# Patient Record
Sex: MALE | Race: OTHER | HISPANIC OR LATINO | URBAN - METROPOLITAN AREA
[De-identification: names, ages, dates, MRNs, and addresses within clinical notes are randomized per-mention and may not be internally consistent; named-entity substitution may affect disease eponyms.]

---

## 2024-10-14 ENCOUNTER — INPATIENT (INPATIENT)
Facility: HOSPITAL | Age: 69
LOS: 0 days | Discharge: HOME | End: 2024-10-15
Attending: INTERNAL MEDICINE | Admitting: INTERNAL MEDICINE
Payer: MEDICARE

## 2024-10-14 VITALS
WEIGHT: 198.42 LBS | SYSTOLIC BLOOD PRESSURE: 150 MMHG | RESPIRATION RATE: 18 BRPM | DIASTOLIC BLOOD PRESSURE: 88 MMHG | HEIGHT: 62 IN | OXYGEN SATURATION: 98 % | HEART RATE: 85 BPM | TEMPERATURE: 98 F

## 2024-10-14 DIAGNOSIS — E78.5 HYPERLIPIDEMIA, UNSPECIFIED: ICD-10-CM

## 2024-10-14 DIAGNOSIS — I20.9 ANGINA PECTORIS, UNSPECIFIED: ICD-10-CM

## 2024-10-14 DIAGNOSIS — I10 ESSENTIAL (PRIMARY) HYPERTENSION: ICD-10-CM

## 2024-10-14 DIAGNOSIS — Z98.49 CATARACT EXTRACTION STATUS, UNSPECIFIED EYE: Chronic | ICD-10-CM

## 2024-10-14 PROBLEM — I63.9 CEREBRAL INFARCTION, UNSPECIFIED: Chronic | Status: ACTIVE | Noted: 2022-08-22

## 2024-10-14 LAB
ALBUMIN SERPL ELPH-MCNC: 4.1 G/DL — SIGNIFICANT CHANGE UP (ref 3.3–5)
ALP SERPL-CCNC: 68 U/L — SIGNIFICANT CHANGE UP (ref 40–120)
ALT FLD-CCNC: 20 U/L — SIGNIFICANT CHANGE UP (ref 4–41)
ANION GAP SERPL CALC-SCNC: 12 MMOL/L — SIGNIFICANT CHANGE UP (ref 7–14)
AST SERPL-CCNC: 21 U/L — SIGNIFICANT CHANGE UP (ref 4–40)
BASOPHILS # BLD AUTO: 0.04 K/UL — SIGNIFICANT CHANGE UP (ref 0–0.2)
BASOPHILS NFR BLD AUTO: 0.7 % — SIGNIFICANT CHANGE UP (ref 0–2)
BILIRUB SERPL-MCNC: 0.6 MG/DL — SIGNIFICANT CHANGE UP (ref 0.2–1.2)
BUN SERPL-MCNC: 36 MG/DL — HIGH (ref 7–23)
CALCIUM SERPL-MCNC: 8.9 MG/DL — SIGNIFICANT CHANGE UP (ref 8.4–10.5)
CHLORIDE SERPL-SCNC: 102 MMOL/L — SIGNIFICANT CHANGE UP (ref 98–107)
CO2 SERPL-SCNC: 24 MMOL/L — SIGNIFICANT CHANGE UP (ref 22–31)
CREAT SERPL-MCNC: 1.45 MG/DL — HIGH (ref 0.5–1.3)
EGFR: 52 ML/MIN/1.73M2 — LOW
EOSINOPHIL # BLD AUTO: 0.06 K/UL — SIGNIFICANT CHANGE UP (ref 0–0.5)
EOSINOPHIL NFR BLD AUTO: 1 % — SIGNIFICANT CHANGE UP (ref 0–6)
GLUCOSE SERPL-MCNC: 102 MG/DL — HIGH (ref 70–99)
HCT VFR BLD CALC: 37.7 % — LOW (ref 39–50)
HGB BLD-MCNC: 12.7 G/DL — LOW (ref 13–17)
IANC: 4.1 K/UL — SIGNIFICANT CHANGE UP (ref 1.8–7.4)
IMM GRANULOCYTES NFR BLD AUTO: 1.9 % — HIGH (ref 0–0.9)
LYMPHOCYTES # BLD AUTO: 0.93 K/UL — LOW (ref 1–3.3)
LYMPHOCYTES # BLD AUTO: 16.1 % — SIGNIFICANT CHANGE UP (ref 13–44)
MAGNESIUM SERPL-MCNC: 2.2 MG/DL — SIGNIFICANT CHANGE UP (ref 1.6–2.6)
MCHC RBC-ENTMCNC: 29.7 PG — SIGNIFICANT CHANGE UP (ref 27–34)
MCHC RBC-ENTMCNC: 33.7 GM/DL — SIGNIFICANT CHANGE UP (ref 32–36)
MCV RBC AUTO: 88.3 FL — SIGNIFICANT CHANGE UP (ref 80–100)
MONOCYTES # BLD AUTO: 0.54 K/UL — SIGNIFICANT CHANGE UP (ref 0–0.9)
MONOCYTES NFR BLD AUTO: 9.3 % — SIGNIFICANT CHANGE UP (ref 2–14)
NEUTROPHILS # BLD AUTO: 4.1 K/UL — SIGNIFICANT CHANGE UP (ref 1.8–7.4)
NEUTROPHILS NFR BLD AUTO: 71 % — SIGNIFICANT CHANGE UP (ref 43–77)
NRBC # BLD: 0 /100 WBCS — SIGNIFICANT CHANGE UP (ref 0–0)
NRBC # FLD: 0 K/UL — SIGNIFICANT CHANGE UP (ref 0–0)
PLATELET # BLD AUTO: 162 K/UL — SIGNIFICANT CHANGE UP (ref 150–400)
POTASSIUM SERPL-MCNC: 4.7 MMOL/L — SIGNIFICANT CHANGE UP (ref 3.5–5.3)
POTASSIUM SERPL-SCNC: 4.7 MMOL/L — SIGNIFICANT CHANGE UP (ref 3.5–5.3)
PROT SERPL-MCNC: 6.9 G/DL — SIGNIFICANT CHANGE UP (ref 6–8.3)
RBC # BLD: 4.27 M/UL — SIGNIFICANT CHANGE UP (ref 4.2–5.8)
RBC # FLD: 13.1 % — SIGNIFICANT CHANGE UP (ref 10.3–14.5)
SODIUM SERPL-SCNC: 138 MMOL/L — SIGNIFICANT CHANGE UP (ref 135–145)
TROPONIN T, HIGH SENSITIVITY RESULT: 22 NG/L — SIGNIFICANT CHANGE UP
TROPONIN T, HIGH SENSITIVITY RESULT: 23 NG/L — SIGNIFICANT CHANGE UP
WBC # BLD: 5.78 K/UL — SIGNIFICANT CHANGE UP (ref 3.8–10.5)
WBC # FLD AUTO: 5.78 K/UL — SIGNIFICANT CHANGE UP (ref 3.8–10.5)

## 2024-10-14 PROCEDURE — 99223 1ST HOSP IP/OBS HIGH 75: CPT

## 2024-10-14 PROCEDURE — 99285 EMERGENCY DEPT VISIT HI MDM: CPT

## 2024-10-14 PROCEDURE — 71045 X-RAY EXAM CHEST 1 VIEW: CPT | Mod: 26

## 2024-10-14 RX ORDER — ATORVASTATIN CALCIUM 10 MG/1
80 TABLET, FILM COATED ORAL AT BEDTIME
Refills: 0 | Status: DISCONTINUED | OUTPATIENT
Start: 2024-10-14 | End: 2024-10-15

## 2024-10-14 RX ORDER — FAMOTIDINE 40 MG
20 TABLET ORAL ONCE
Refills: 0 | Status: COMPLETED | OUTPATIENT
Start: 2024-10-14 | End: 2024-10-14

## 2024-10-14 RX ORDER — CYCLOBENZAPRINE HCL 10 MG
5 TABLET ORAL ONCE
Refills: 0 | Status: COMPLETED | OUTPATIENT
Start: 2024-10-14 | End: 2024-10-14

## 2024-10-14 RX ORDER — ASPIRIN 325 MG
81 TABLET ORAL DAILY
Refills: 0 | Status: DISCONTINUED | OUTPATIENT
Start: 2024-10-14 | End: 2024-10-15

## 2024-10-14 RX ORDER — ACETAMINOPHEN 325 MG
1000 TABLET ORAL ONCE
Refills: 0 | Status: COMPLETED | OUTPATIENT
Start: 2024-10-14 | End: 2024-10-14

## 2024-10-14 RX ORDER — SODIUM CHLORIDE 0.9 % (FLUSH) 0.9 %
1000 SYRINGE (ML) INJECTION ONCE
Refills: 0 | Status: DISCONTINUED | OUTPATIENT
Start: 2024-10-14 | End: 2024-10-14

## 2024-10-14 RX ADMIN — Medication 20 MILLIGRAM(S): at 10:31

## 2024-10-14 RX ADMIN — ATORVASTATIN CALCIUM 80 MILLIGRAM(S): 10 TABLET, FILM COATED ORAL at 21:55

## 2024-10-14 RX ADMIN — Medication 400 MILLIGRAM(S): at 10:31

## 2024-10-14 RX ADMIN — Medication 5 MILLIGRAM(S): at 13:42

## 2024-10-14 NOTE — H&P ADULT - NSHPPHYSICALEXAM_GEN_ALL_CORE
Vital Signs Last 24 Hrs  T(C): 36.4 (14 Oct 2024 16:55), Max: 36.7 (14 Oct 2024 09:02)  T(F): 97.5 (14 Oct 2024 16:55), Max: 98 (14 Oct 2024 09:02)  HR: 61 (14 Oct 2024 16:55) (50 - 90)  BP: 128/78 (14 Oct 2024 16:55) (115/73 - 150/88)  BP(mean): --  RR: 16 (14 Oct 2024 16:55) (16 - 18)  SpO2: 100% (14 Oct 2024 16:55) (98% - 100%)    Parameters below as of 14 Oct 2024 16:55  Patient On (Oxygen Delivery Method): room air        PHYSICAL EXAM:  GENERAL: No Acute Distress  EYES: conjunctiva and sclera clear  ENMT: Moist mucous membranes   NECK: Supple  PULMONARY: Clear to auscultation bilaterally  CARDIAC: Regular rate and rhythm; No murmurs, rubs, or gallops  GASTROINTESTINAL: Abdomen soft, Nontender, Nondistended; Bowel sounds normal  EXTREMITIES:   No clubbing, cyanosis, or pedal edema  PSYCH: Normal Affect, Normal Behavior  NEUROLOGY:   - Mental status A&O x 3  SKIN: No rashes or lesions  MUSCULOSKELETAL: No joint swelling

## 2024-10-14 NOTE — ED ADULT NURSE NOTE - OBJECTIVE STATEMENT
Pt. A&OX4, c/o left sided chest pain radiating into neck starting today. Denies sob, dizziness, palpitations or n/v. Also c/o lower back pain worsening x few weeks. hx of 2 cardiac stents a few years ago. MD at bedside for evaluation. Vitals stable, breathing well on RA. Respirations even and unlabored. Placed on cardiac monitor. NSR noted. Will continue to monitor.

## 2024-10-14 NOTE — ED ADULT NURSE NOTE - NSFALLUNIVINTERV_ED_ALL_ED
Assistance OOB with selected safe patient handling equipment if applicable/Bed/Stretcher in lowest position, wheels locked, appropriate side rails in place/Call bell, personal items and telephone in reach/Instruct patient to call for assistance before getting out of bed/chair/stretcher/Non-slip footwear applied when patient is off stretcher/[place] to call system/Physically safe environment - no spills, clutter or unnecessary equipment/Purposeful proactive rounding/Room/bathroom lighting operational, light cord in reach

## 2024-10-14 NOTE — H&P ADULT - HISTORY OF PRESENT ILLNESS
68 y/o M with pmh of HTN, HLD, CAD s/p stent, CVA 2 year ago (with R lateral visual field defect) p/w chest pain.  Pt reports L sided chest pain since yesterday.  Pt reports pain is worse with exertion and improves with rest. Pain is described as sharp to pinching in quality.  No associated diaphoresis or dizziness.  No fever, chills or cough.  Pt also reports Lower back pain that radiates to LLQ for the past month.      In the ED, pt was given 1L NS, Tylenol, Pepcid, and Flexeril.

## 2024-10-14 NOTE — ED PROVIDER NOTE - NS ED ROS FT
CONSTITUTIONAL: No fever or chill  HEENT: Denies changes in vision and hearing.  RESPIRATORY: Denies SOB and cough.  CV: left sided chest pain  GI: Denies abdominal pain, nausea, vomiting and diarrhea.  : Denies dysuria and urinary frequency.  MSK: Left-sided neck pain, low back pain  SKIN: Denies rash   NEUROLOGICAL: Denies headache and syncope.

## 2024-10-14 NOTE — H&P ADULT - NSICDXPASTSURGICALHX_GEN_ALL_CORE_FT
[FreeTextEntry6] : Pt in clinic for lab results from last weeks CPE and to have sport form completed. Pt forgot to bring signed parental consent to play sports at last visit, brought it today. Pt appears well, no complaints.
PAST SURGICAL HISTORY:  History of cataract surgery     S/P Inguinal Hernia Repair Right, 2007    S/P Inguinal Hernia Repair Using Synthetic Patch right, 7/14/2009

## 2024-10-14 NOTE — PATIENT PROFILE ADULT - FUNCTIONAL ASSESSMENT - BASIC MOBILITY 6.
4-calculated by average/Not able to assess (calculate score using [facility] averaging method)

## 2024-10-14 NOTE — ED PROVIDER NOTE - NSICDXPASTMEDICALHX_GEN_ALL_CORE_FT
PAST MEDICAL HISTORY:  CVA (cerebrovascular accident)     HLD (hyperlipidemia)     HTN (Hypertension)

## 2024-10-14 NOTE — H&P ADULT - NSHPREVIEWOFSYSTEMS_GEN_ALL_CORE
Review of Systems:   CONSTITUTIONAL: No fever or chills  EYES: No eye pain, visual disturbances, or discharge  ENMT:  No difficulty hearing, no throat pain  NECK: No pain or stiffness  RESPIRATORY: No cough, No shortness of breath  CARDIOVASCULAR:  chest pain, no palpitations, dizziness, or leg swelling  GASTROINTESTINAL: No abdominal pain, nausea, vomiting or diarrhea  GENITOURINARY: No dysuria, or hematuria  NEUROLOGICAL: No headaches, weakness, or numbness  SKIN: No rashes, or lesions   LYMPH NODES: No enlarged glands  ENDOCRINE: No heat or cold intolerance  MUSCULOSKELETAL: back pain  PSYCHIATRIC: No depression or anxiety  HEME/LYMPH: No easy bruising, or bleeding  ALLERGY AND IMMUNOLOGIC: No hives or eczema

## 2024-10-14 NOTE — ED PROVIDER NOTE - NSICDXPASTSURGICALHX_GEN_ALL_CORE_FT
PAST SURGICAL HISTORY:  History of cataract surgery     S/P Inguinal Hernia Repair Right, 2007    S/P Inguinal Hernia Repair Using Synthetic Patch right, 7/14/2009

## 2024-10-14 NOTE — H&P ADULT - NSHPLABSRESULTS_GEN_ALL_CORE
12.7   5.78  )-----------( 162      ( 14 Oct 2024 10:30 )             37.7     138  |  102  |  36[H]  ----------------------------<  102[H]     10-14  4.7   |  24  |  1.45[H]    Ca    8.9      14 Oct 2024 10:30  Mg     2.20     10-14    TPro  6.9  /  Alb  4.1  /  TBili  0.6  /  DBili  x   /  AST  21  /  ALT  20  /  AlkPhos  68  10-14                hs Troponin, T - 22 ng/L (10-14-24 @ 12:17)  hs Troponin, T - 23 ng/L (10-14-24 @ 10:30)            EKG reviewed: Sinus bradycardia with LBBB similar to prior study.        CXR reviewed: Clear lungs

## 2024-10-14 NOTE — H&P ADULT - ASSESSMENT
70 y/o M with pmh of HTN, HLD, CAD s/p stent, CVA 2 year ago (with R lateral visual field defect) p/w chest pain.

## 2024-10-14 NOTE — ED ADULT NURSE REASSESSMENT NOTE - NS ED NURSE REASSESS COMMENT FT1
Pt's HR dropped to 48-50. MD Chaves informed. Pt. has no complaints and appears comfortable. BP within normal range. Will continue to monitor.

## 2024-10-14 NOTE — ED PROVIDER NOTE - ATTENDING CONTRIBUTION TO CARE
danisha: 69-year-old man complains about left-sided neck pain going down into his chest for the last 2 days.  Medications include aspirin atorvastatin clopidogrel enoxaparin and lisinopril    Patient is a history of stroke cardiac stents hypertension high lipids, mild CKD    Denies shortness of breath abdominal pain nausea vomiting diarrhea fever    Blood pressure 130/72 respiratory rate 18 heart rate 90 temp 98 O2 sat is 90    Patient awake alert able to communicate well.  h at/nc  perrl, conj clear, sclera anicteric,  neck supple, neck with spasm on left side trapezius as well as left strap muscles.  Improved mildly with muscle pressure  cor rrr pos s1s2  lungs clear to asno wheeze  abd soft no r/g/t  ext no edema no deformities  neueo awake, lucid normal gait moves all extremities with strength  psych normal affect  vs reasonable    My interpretation: patient with a EKG done at 901 shows left bundle branch block rate of 61 essentially the same as compared to August 2022.  At that point was bradycardic to 52 but had significant left bundle branch block and ST morphology    Patient is certainly high risk for any sort of vascular events however this very much on physical exam seems like it is muscle spasm in the left strap muscles and left trapezius primarily we will treat for muscle spasm but will also evaluate with troponin for any ACS.  Although triage note can give classic story for cardiac dissection physical exam and history in the room does not      I performed a history and physical exam of the patient and discussed their management with the resident and /or advanced care provider. I personally made/approved the management plan and take responsibility for the patient management. I reviewed the resident and /or ACP's note and agree with the documented findings and plan of care. My medical decison making and observations are found above.

## 2024-10-14 NOTE — ED PROVIDER NOTE - PHYSICAL EXAMINATION
GENERAL: Alert. No acute distress.   EYES: EOMI grossly normal. Anicteric.   HENT: Moist mucous membranes. left sided neck muscle spasm.   RESP: No conversation dyspnea, no resp distress, CTAB   CARDIOVASCULAR: RRR, no m/g/r, left sided chest pain not reproducible   ABDOMEN: soft, non distended, nttp  MSK: ROM grossly normal in all 4 extremities. No deformities. No skin finding on lower lumber spine.   SKIN: warm and dry  NEUROLOGIC: Alert and oriented x3, 5/5 strength, sensation intact in BLE.   PSYCHIATRIC: Cooperative. Appropriate mood and affect

## 2024-10-14 NOTE — ED PROVIDER NOTE - CLINICAL SUMMARY MEDICAL DECISION MAKING FREE TEXT BOX
70 yo Ma[place] speaking M PMHx HTN, HLD, CAD s/p stent, CVA 2 year ago, here for left sided chest pain.  patient also reports left-sided neck pain, and lower lumbar pain.  Highest suspicion is MSK.  However, because of patient history, will get chest pain workup.  Consider aortic dissection as patient have chest pain and back pain.  However due to pain and not related, and patient have had back pain for the last 1-1/2-month.  Blood pressure is within goal, patient is comfortable in bed.  Very low suspicion for aortic dissection.   Dispo pending workup 70 yo Chilean speaking M PMHx HTN, HLD, CAD s/p stent, CVA 2 year ago, here for left sided chest pain.  patient also reports left-sided neck pain, and lower lumbar pain.  Highest suspicion is MSK.  However, because of patient history, will get chest pain workup.  Consider aortic dissection as patient have chest pain and back pain.  However due to pain and not related, and patient have had back pain for the last 1-1/2-month.  Blood pressure is within goal, patient is comfortable in bed.  Very low suspicion for aortic dissection.   Dispo pending workup    danisha: 69-year-old man complains about left-sided neck pain going down into his chest for the last 2 days.  Medications include aspirin atorvastatin clopidogrel enoxaparin and lisinopril    Patient is a history of stroke cardiac stents hypertension high lipids, mild CKD    Denies shortness of breath abdominal pain nausea vomiting diarrhea fever    Blood pressure 130/72 respiratory rate 18 heart rate 90 temp 98 O2 sat is 90    Patient awake alert able to communicate well.  h at/nc  perrl, conj clear, sclera anicteric,  neck supple, neck with spasm on left side trapezius as well as left strap muscles.  Improved mildly with muscle pressure  cor rrr pos s1s2  lungs clear to asno wheeze  abd soft no r/g/t  ext no edema no deformities  neueo awake, lucid normal gait moves all extremities with strength  psych normal affect  vs reasonable    My interpretation: patient with a EKG done at 901 shows left bundle branch block rate of 61 essentially the same as compared to August 2022.  At that point was bradycardic to 52 but had significant left bundle branch block and ST morphology    Patient is certainly high risk for any sort of vascular events however this very much on physical exam seems like it is muscle spasm in the left strap muscles and left trapezius primarily we will treat for muscle spasm but will also evaluate with troponin for any ACS.  Although triage note can give classic story for cardiac dissection physical exam and history in the room does not

## 2024-10-14 NOTE — ED PROVIDER NOTE - NSFOLLOWUPINSTRUCTIONS_ED_ALL_ED_FT
Calambres y espasmos musculares  Muscle Cramps and Spasms  Los calambres y espasmos musculares suceden cuando los músculos se tensan por sí mismos. Pueden ocurrir en cualquier músculo. Ocurren con más frecuencia en los músculos de la parte posterior de la parte inferior de la pierna (pantorrilla).    Los calambres musculares son dolorosos. A menudo son más ramón y peguero más tiempo que los espasmos musculares. Los espasmos musculares pueden o no ser dolorosos.    En muchos casos, se desconoce la causa de un calambre o espasmo muscular. Michael puede deberse a lo siguiente:  Hacer más trabajo o actividad física de lo que el cuerpo soporta.  Usar demasiado los músculos (uso excesivo).  Permanecer en amalia posición ollie mucho tiempo.  No prepararse lo suficiente o no estar en buen estado físico cuando se practica un deporte o realiza amalia actividad.  Lastimarse.  Algunas otras causas son las siguientes:  No tener suficiente agua en el cuerpo (deshidratación).  Pascual ciertos medicamentos.  No tener suficientes sales y minerales en el cuerpo (electrolitos).  Siga estas instrucciones en luciano casa:  Comida y bebida    Maria Luisa suficiente líquido para mantener el pis (orina) de color amarillo pálido.  Consuma amalia dieta katherine para ayudar a los músculos a funcionar allyssa. La dieta debe incluir lo siguiente:  Frutas y vegetales.  Proteínas magras.  Cereales integrales.  Productos lácteos descremados o con bajo contenido de grasa.  Control del dolor y la rigidez    Bag of ice on a towel on the skin.  A heating pad for use on the affected area.  [place]ajee, elongue y relaje el músculo. Hágalo ollie algunos minutos cada vez.  Si se lo indican, aplique hielo en el músculo. [place] puede ayudar si después de un calambre o espasmo tiene dolor o sensibilidad.  Ponga el hielo en amalia bolsa plástica.  Coloque amalia toalla entre la piel y la bolsa.  Aplique el hielo ollie 20 minutos, 2 a 3 veces por día.  Si se lo indican, aplique calor sobre los músculos tensos o tirantes. Hágalo con la frecuencia que le haya indicado el médico. Use la ellie de calor que el médico le recomiende, jovon amalia compresa de calor húmedo o amalia almohadilla térmica.  Coloque amalia toalla entre la piel y la ellie de calor.  Aplique calor ollie 20 a 30 minutos.  Si la piel se le pone de color mercado brillante, quite el hielo o el calor de inmediato para evitar daños en la piel. El riesgo de daño es mayor si no puede sentir dolor, calor o frío.  [place] duchas o carlita con [place] para ayudar a relajar los músculos.  Instrucciones generales    Si tiene calambres con frecuencia, evite el ejercicio intenso ollie algunos días.  Use los medicamentos de venta irena y los recetados solamente jovon se lo haya indicado el médico.  Controle si hay algún cambio en ani síntomas.  Comuníquese con un médico si:  Ani calambres o espasmos empeoran u ocurren con más frecuencia.  Ani calambres o espasmos no mejoran con el tiempo.  Esta información no tiene jovon fin reemplazar el consejo del médico. Asegúrese de hacerle al médico cualquier pregunta que tenga.    Document Revised: 09/06/2023 Document Reviewed: 09/06/2023  Elsevier Patient Education © 2024 Else[employer] Inc. Dr. Brambila Alerte  Telephone [phone]

## 2024-10-15 ENCOUNTER — TRANSCRIPTION ENCOUNTER (OUTPATIENT)
Age: 69
End: 2024-10-15

## 2024-10-15 ENCOUNTER — RESULT REVIEW (OUTPATIENT)
Age: 69
End: 2024-10-15

## 2024-10-15 VITALS — DIASTOLIC BLOOD PRESSURE: 78 MMHG | SYSTOLIC BLOOD PRESSURE: 114 MMHG

## 2024-10-15 LAB
ANION GAP SERPL CALC-SCNC: 13 MMOL/L — SIGNIFICANT CHANGE UP (ref 7–14)
BUN SERPL-MCNC: 35 MG/DL — HIGH (ref 7–23)
CALCIUM SERPL-MCNC: 9.3 MG/DL — SIGNIFICANT CHANGE UP (ref 8.4–10.5)
CHLORIDE SERPL-SCNC: 104 MMOL/L — SIGNIFICANT CHANGE UP (ref 98–107)
CO2 SERPL-SCNC: 22 MMOL/L — SIGNIFICANT CHANGE UP (ref 22–31)
CREAT SERPL-MCNC: 1.46 MG/DL — HIGH (ref 0.5–1.3)
EGFR: 51 ML/MIN/1.73M2 — LOW
GLUCOSE SERPL-MCNC: 96 MG/DL — SIGNIFICANT CHANGE UP (ref 70–99)
HCT VFR BLD CALC: 42.4 % — SIGNIFICANT CHANGE UP (ref 39–50)
HGB BLD-MCNC: 13.9 G/DL — SIGNIFICANT CHANGE UP (ref 13–17)
MAGNESIUM SERPL-MCNC: 2.1 MG/DL — SIGNIFICANT CHANGE UP (ref 1.6–2.6)
MCHC RBC-ENTMCNC: 29.4 PG — SIGNIFICANT CHANGE UP (ref 27–34)
MCHC RBC-ENTMCNC: 32.8 GM/DL — SIGNIFICANT CHANGE UP (ref 32–36)
MCV RBC AUTO: 89.6 FL — SIGNIFICANT CHANGE UP (ref 80–100)
NRBC # BLD: 0 /100 WBCS — SIGNIFICANT CHANGE UP (ref 0–0)
NRBC # FLD: 0 K/UL — SIGNIFICANT CHANGE UP (ref 0–0)
PHOSPHATE SERPL-MCNC: 3.3 MG/DL — SIGNIFICANT CHANGE UP (ref 2.5–4.5)
PLATELET # BLD AUTO: 185 K/UL — SIGNIFICANT CHANGE UP (ref 150–400)
POTASSIUM SERPL-MCNC: 4.6 MMOL/L — SIGNIFICANT CHANGE UP (ref 3.5–5.3)
POTASSIUM SERPL-SCNC: 4.6 MMOL/L — SIGNIFICANT CHANGE UP (ref 3.5–5.3)
RBC # BLD: 4.73 M/UL — SIGNIFICANT CHANGE UP (ref 4.2–5.8)
RBC # FLD: 13.1 % — SIGNIFICANT CHANGE UP (ref 10.3–14.5)
SODIUM SERPL-SCNC: 139 MMOL/L — SIGNIFICANT CHANGE UP (ref 135–145)
TROPONIN T, HIGH SENSITIVITY RESULT: 22 NG/L — SIGNIFICANT CHANGE UP
WBC # BLD: 6.2 K/UL — SIGNIFICANT CHANGE UP (ref 3.8–10.5)
WBC # FLD AUTO: 6.2 K/UL — SIGNIFICANT CHANGE UP (ref 3.8–10.5)

## 2024-10-15 PROCEDURE — 93458 L HRT ARTERY/VENTRICLE ANGIO: CPT | Mod: 26

## 2024-10-15 PROCEDURE — 93306 TTE W/DOPPLER COMPLETE: CPT | Mod: 26

## 2024-10-15 PROCEDURE — 99152 MOD SED SAME PHYS/QHP 5/>YRS: CPT

## 2024-10-15 PROCEDURE — 99223 1ST HOSP IP/OBS HIGH 75: CPT | Mod: 25

## 2024-10-15 RX ORDER — SODIUM CHLORIDE 0.9 % (FLUSH) 0.9 %
1000 SYRINGE (ML) INJECTION
Refills: 0 | Status: DISCONTINUED | OUTPATIENT
Start: 2024-10-15 | End: 2024-10-15

## 2024-10-15 RX ADMIN — Medication 81 MILLIGRAM(S): at 10:18

## 2024-10-15 RX ADMIN — Medication 20 MILLIGRAM(S): at 05:21

## 2024-10-15 NOTE — CONSULT NOTE ADULT - ASSESSMENT
No
  Patient is a 70 y/o man with HTN, HLD, CVA sustained ~2 year ago (resulting with a Right lateral visual field defect).   Patient is a poor historian.  As per patient, he also has a history of prior coronary artery intervention at [facility] but we have no prior records.  Also reports VAUGHAN/SOB.  No other associated cardiac complaints.    In the ED, the patient appeared clinically and hemodynamically stable.  My review of his 12-lead ECG notes SR, normal axis, normal intervals, without evidence of ischemia.  CXR clear lungs  Cardiac biomarkers flat.  Echocardiogram noted normal biventricular systolic function, mild diastolic dysfunction.    When evaluated by me in the ED, the patient was clinically and hemodynamically stable.  Appears euvolemic    Given patient's possible history of CAD, will arrange for LHC.  If no significant findings noted on TTE, the patient may be discharged to home with the recommendations to see his PCP within 2-4 weeks after discharge.

## 2024-10-15 NOTE — DISCHARGE NOTE NURSING/CASE MANAGEMENT/SOCIAL WORK - NSDCFUADDAPPT_GEN_ALL_CORE_FT
Follow up with your primary care doctor within one week of discharge. If you do not have one, you can call the medicine clinic at [phone] to make an appointment.

## 2024-10-15 NOTE — DISCHARGE NOTE NURSING/CASE MANAGEMENT/SOCIAL WORK - NSDCPEFALRISK_GEN_ALL_CORE
L DADA SS    Patient location during procedure: pre-op   Block not for primary anesthetic.  Reason for block: at surgeon's request and post-op pain management   Post-op Pain Location: L kidney pain   Start time: 3/24/2022 7:02 AM  Timeout: 3/24/2022 7:01 AM   End time: 3/24/2022 7:04 AM    Staffing  Authorizing Provider: Jade Croft MD  Performing Provider: Jade Croft MD    Preanesthetic Checklist  Completed: patient identified, IV checked, site marked, risks and benefits discussed, surgical consent, monitors and equipment checked, pre-op evaluation and timeout performed  Peripheral Block  Patient position: sitting  Prep: ChloraPrep  Patient monitoring: heart rate, cardiac monitor, continuous pulse ox, continuous capnometry and frequent blood pressure checks  Block type: erector spinae plane  Laterality: left  Injection technique: single shot  Interspace: T9-10    Needle  Needle type: Tuohy   Needle gauge: 21 G  Needle length: 4 in  Needle localization: anatomical landmarks and ultrasound guidance  Needle insertion depth: 4 cm   -ultrasound image captured on disc.  Assessment  Injection assessment: negative aspiration, negative parasthesia and local visualized surrounding nerve  Paresthesia pain: none  Heart rate change: no  Slow fractionated injection: yes  Pain Tolerance: comfortable throughout block and no complaints  Medications:    Medications: bupivacaine (pf) (MARCAINE) injection 0.75% - Perineural   15 mL - 3/24/2022 7:02:00 AM    Additional Notes  Patient tolerated well.  See [facility] RN record for vitals.VSS. [facility] nurse monitoring vitals throughout  0.375% bupiv used   Local injected incrementally after neg asp  Local with 1: 400 k epi, 1 mg PF Decadron and 50 ug  PF Clonidine  Pt tolerated well             
R IJ Trialysis catheter    Diagnosis: End stage renal disease  Patient location during procedure: done in OR    Staffing  Authorizing Provider: Brayden Zambrano MD  Performing Provider: Brayden Zambrano MD    Staffing  Performed: anesthesiologist   Anesthesiologist: Brayden Zambrano MD  Anesthesiologist was present at the time of the procedure.  Preanesthetic Checklist  Completed: patient identified, IV checked, site marked, risks and benefits discussed, surgical consent, monitors and equipment checked, pre-op evaluation, timeout performed and anesthesia consent given  Indication   Indication: hemodialysis     Anesthesia   general anesthesia    Central Line   Skin Prep: skin prepped with ChloraPrep, skin prep agent completely dried prior to procedure  Sterile Barriers Followed: Yes    All five maximal barriers used- gloves, gown, cap, mask, and large sterile sheet    hand hygiene performed prior to central venous catheter insertion  Location: right internal jugular.   Catheter type: dialysis  Catheter Size: 12 Fr  Inserted Catheter Length: 16 cm  Ultrasound: vascular probe with ultrasound   Vessel Caliber: large, patent  Vascular Doppler:  not done, compressibility normal  Needle advanced into vessel with real time Ultrasound guidance.  sterile gel and probe cover used in ultrasound-guided central venous catheter insertion   Manometry: Venous cannualation confirmed by visual estimation of blood vessel pressure using manometry.  Insertion Attempts: 1   Securement:line sutured, chlorhexidine patch, blood return through all ports and sterile dressing applied    Post-Procedure    Adverse Events:none      Guidewire Guidewire removed intact. Guidewire removed intact, verified with nurse.           
For information on Fall & Injury Prevention, visit: [email or web address] OR  [email or web address] OR  https://www.cdc.gov/steadi/patient.html

## 2024-10-15 NOTE — DISCHARGE NOTE NURSING/CASE MANAGEMENT/SOCIAL WORK - PATIENT PORTAL LINK FT
You can access the FollowMyHealth Patient Portal offered by [facility] by registering at the following website: [email or web address]. By joining [employer]’s FollowMyHealth portal, you will also be able to view your health information using other applications (apps) compatible with our system.

## 2024-10-15 NOTE — DISCHARGE NOTE PROVIDER - NSDCCPTREATMENT_GEN_ALL_CORE_FT
Thank you for choosing Veda Candelaria PA-C at [place] [address]  To Do: Sheree Posadas  1. Pt to continue medications as prescribed  2.  Pt to follow-up in 6 months, sooner if problems    • Please signup for MY CHART, which is electronic access to you improve your quality of life.     Referrals, and Radiology Information:    If your insurance requires a referral to a specialist, please allow 5 business days to process your referral request.    If Nito Harrell PA-C orders a CT or MRI, it may take up PRINCIPAL PROCEDURE  Procedure: Left heart cardiac catheterization  Findings and Treatment: Luminal irregularities. LVEDP: 8      SECONDARY PROCEDURE  Procedure: Complete transthoracic echocardiography (TTE)  Findings and Treatment: 1. Left ventricular wall thickness is mildly increased. Left ventricular systolic function is normal with an ejection fraction of 59 % by Roth's method of disks.   2. There is mild (grade 1) left ventricular diastolic dysfunction.   3. Moderately enlarged right ventricular cavity size, with normal wall thickness, and reduced right ventricular systolic function.   4. Nosignificant valvular disease.   5. No pericardial effusion seen.

## 2024-10-15 NOTE — DISCHARGE NOTE PROVIDER - NSDCFUADDAPPT_GEN_ALL_CORE_FT
Follow up with your primary care doctor within one week of discharge. If you do not have one, you can call the medicine clinic at [phone] to make an appointment.

## 2024-10-15 NOTE — DISCHARGE NOTE PROVIDER - HOSPITAL COURSE
69 M Welsh speaking with PMHx HTN, HLD, CAD s/p stent, CVA (2022 with residual R lateral visual field defect) presents with left sided chest pain x1 day. Troponin negative (23->22->22). TTE (10/15) showed EF 59%, reduced RV function. LHC (10/15) via RRA access showed luminal irregularities with LVEDP 8.    Patient seen and evaluated. Reviewed discharge medications with patient and attending. Reviewed need for prescription for previous home medications, none requested. Medically cleared/stable for discharge as per Dr. Davalos on 10/15/2024 with appropriate follow up. Patient understands and agrees with plan of care.

## 2024-10-15 NOTE — CONSULT NOTE ADULT - SUBJECTIVE AND OBJECTIVE BOX
Cardiology/Vascular Medicine Inpatient Consultation Note    Date of Admission:        CHIEF COMPLAINT:        HISTORY OF PRESENT ILLNESS:  HPI:  68 y/o M with pmh of HTN, HLD, CAD s/p stent, CVA 2 year ago (with R lateral visual field defect) p/w chest pain.  Pt reports L sided chest pain since yesterday.  Pt reports pain is worse with exertion and improves with rest. Pain is described as sharp to pinching in quality.  No associated diaphoresis or dizziness.  No fever, chills or cough.  Pt also reports Lower back pain that radiates to LLQ for the past month.      In the ED, pt was given 1L NS, Tylenol, Pepcid, and Flexeril.   (14 Oct 2024 17:43)          Allergies    No Known Allergies    Intolerances    	    MEDICATIONS:  aspirin  chewable 81 milliGRAM(s) Oral daily  lisinopril 20 milliGRAM(s) Oral daily            atorvastatin 80 milliGRAM(s) Oral at bedtime        PAST MEDICAL & SURGICAL HISTORY:  HTN (Hypertension)      HLD (hyperlipidemia)      CVA (cerebrovascular accident)      S/P Inguinal Hernia Repair Using Synthetic Patch  right, 7/14/2009      S/P Inguinal Hernia Repair  Right, 2007      History of cataract surgery          FAMILY HISTORY:  Family history of acute myocardial infarction (Mother)        SOCIAL HISTORY:    [ ] Non-smoker  [ ] Smoker  [ ] Alcohol    REVIEW OF SYSTEMS:  CONSTITUTIONAL: No fever, weight loss, or fatigue  EYES: No eye pain, visual disturbances, or discharge  ENMT:  No difficulty hearing, tinnitus, vertigo; No sinus or throat pain  NECK: No pain or stiffness  RESPIRATORY: No cough, wheezing, chills or hemoptysis; No Shortness of Breath  CARDIOVASCULAR: No chest pain, palpitations, passing out, dizziness, or leg swelling  GASTROINTESTINAL: No abdominal or epigastric pain. No nausea, vomiting, or hematemesis; No diarrhea or constipation. No melena or hematochezia.  GENITOURINARY: No dysuria, frequency, hematuria, or incontinence  NEUROLOGICAL: No headaches, memory loss, loss of strength, numbness, or tremors  SKIN: No itching, burning, rashes, or lesions   LYMPH Nodes: No enlarged glands  ENDOCRINE: No heat or cold intolerance; No hair loss  MUSCULOSKELETAL: No joint pain or swelling; No muscle, back, or extremity pain  PSYCHIATRIC: No depression, anxiety, mood swings, or difficulty sleeping  HEME/LYMPH: No easy bruising, or bleeding gums  ALLERY AND IMMUNOLOGIC: No hives or eczema	    [ ] All others negative	  [ ] Unable to obtain    PHYSICAL EXAM:  T(C): 36.7 (10-15-24 @ 05:15), Max: 36.7 (10-14-24 @ 09:02)  HR: 60 (10-15-24 @ 05:15) (50 - 90)  BP: 130/67 (10-15-24 @ 05:15) (115/73 - 150/88)  RR: 16 (10-15-24 @ 05:15) (16 - 18)  SpO2: 100% (10-15-24 @ 05:15) (98% - 100%)  Wt(kg): --  I&O's Summary      Appearance: Normal	  HEENT:   Normal oral mucosa, PERRL, EOMI	  Lymphatic: No lymphadenopathy  Cardiovascular: Normal S1 S2, No JVD, No murmurs, No edema  Respiratory: Lungs clear to auscultation	  Psychiatry: A & O x 3, Mood & affect appropriate  Gastrointestinal:  Soft, Non-tender, + BS	  Skin: No rashes, No ecchymoses, No cyanosis	  Neurologic: Non-focal  Extremities: Normal range of motion, No clubbing, cyanosis or edema  Vascular: Peripheral pulses palpable 2+ bilaterally      LABS:	 	    CBC Full  -  ( 15 Oct 2024 06:33 )  WBC Count : 6.20 K/uL  Hemoglobin : 13.9 g/dL  Hematocrit : 42.4 %  Platelet Count - Automated : 185 K/uL  Mean Cell Volume : 89.6 fL  Mean Cell Hemoglobin : 29.4 pg  Mean Cell Hemoglobin Concentration : 32.8 gm/dL  Auto Neutrophil # : x  Auto Lymphocyte # : x  Auto Monocyte # : x  Auto Eosinophil # : x  Auto Basophil # : x  Auto Neutrophil % : x  Auto Lymphocyte % : x  Auto Monocyte % : x  Auto Eosinophil % : x  Auto Basophil % : x    10-15    139  |  104  |  35[H]  ----------------------------<  96  4.6   |  22  |  1.46[H]  10-14    138  |  102  |  36[H]  ----------------------------<  102[H]  4.7   |  24  |  1.45[H]    Ca    9.3      15 Oct 2024 06:33  Ca    8.9      14 Oct 2024 10:30  Phos  3.3     10-15  Mg     2.10     10-15  Mg     2.20     10-14    TPro  6.9  /  Alb  4.1  /  TBili  0.6  /  DBili  x   /  AST  21  /  ALT  20  /  AlkPhos  68  10-14      proBNP:   Lipid Profile:   HgA1c:   TSH:       CARDIAC MARKERS:            TELEMETRY: 	    ECG:   	  RADIOLOGY:  OTHER: 	      PREVIOUS DIAGNOSTIC CARDIOVASCULAR TESTING:      [ ]  Echocardiogram:  [ ]  Catheterization:  [ ]  Stress test:    [ ]  Vascular studies:  	  	     Cardiology/Vascular Medicine Inpatient Consultation Note      HISTORY OF PRESENT ILLNESS:    Patient is a 68 y/o man with HTN, HLD, CVA sustained ~2 year ago (resulting with a Right lateral visual field defect).   Patient is a poor historian.  As per patient, he also has a history of prior coronary artery intervention at [facility] but we have no prior records.  Also reports VAUGHAN/SOB.  No other associated cardiac complaints.    In the ED, the patient appeared clinically and hemodynamically stable.  My review of his 12-lead ECG notes SR, normal axis, normal intervals, without evidence of ischemia.  CXR clear lungs  Cardiac biomarkers flat.  Echocardiogram noted normal biventricular systolic function, mild diastolic dysfunction.    When evaluated by me in the ED, the patient was clinically and hemodynamically stable.  Appears euvolemic    Given patient's possible history of CAD, will arrange for LHC.  If no signficant findings noted on TTE, the patient may be discharged to home with the recommendations to see his PCP within 2-4 weeks after discharge.    Allergies    No Known Allergies    Intolerances    MEDICATIONS:  aspirin  chewable 81 milliGRAM(s) Oral daily  lisinopril 20 milliGRAM(s) Oral daily  atorvastatin 80 milliGRAM(s) Oral at bedtime    PAST MEDICAL & SURGICAL HISTORY:  HTN (Hypertension)  HLD (hyperlipidemia)  CVA (cerebrovascular accident)  S/P Inguinal Hernia Repair Using Synthetic Patch right, 7/14/2009  S/P Inguinal Hernia Repair Right, 2007  History of cataract surgery    FAMILY HISTORY:  Family history of acute myocardial infarction (Mother)    SOCIAL HISTORY:    As above, as per chart notes    REVIEW OF SYSTEMS:  As above, as per chart notes    PHYSICAL EXAM:  T(C): 36.7 (10-15-24 @ 05:15), Max: 36.7 (10-14-24 @ 09:02)  HR: 60 (10-15-24 @ 05:15) (50 - 90)  BP: 130/67 (10-15-24 @ 05:15) (115/73 - 150/88)  RR: 16 (10-15-24 @ 05:15) (16 - 18)  SpO2: 100% (10-15-24 @ 05:15) (98% - 100%)    Appearance: NAD  HEENT:   No apparent JVD    Cardiovascular: Normal S1 S2, No JVD, No murmurs, No edema  Respiratory: Lungs clear to auscultation	  Psychiatry: A & O x 3, Mood & affect appropriate  Gastrointestinal:  Soft, Non-tender, + BS	  Skin: No rashes, No ecchymoses, No cyanosis	  Neurologic: Non-focal  Extremities: Normal range of motion, No clubbing, cyanosis or edema  Vascular: Peripheral pulses palpable 2+ bilaterally      LABS:	 	    CBC Full  -  ( 15 Oct 2024 06:33 )  WBC Count : 6.20 K/uL  Hemoglobin : 13.9 g/dL  Hematocrit : 42.4 %  Platelet Count - Automated : 185 K/uL  Mean Cell Volume : 89.6 fL  Mean Cell Hemoglobin : 29.4 pg  Mean Cell Hemoglobin Concentration : 32.8 gm/dL  Auto Neutrophil # : x  Auto Lymphocyte # : x  Auto Monocyte # : x  Auto Eosinophil # : x  Auto Basophil # : x  Auto Neutrophil % : x  Auto Lymphocyte % : x  Auto Monocyte % : x  Auto Eosinophil % : x  Auto Basophil % : x    10-15    139  |  104  |  35[H]  ----------------------------<  96  4.6   |  22  |  1.46[H]  10-14    138  |  102  |  36[H]  ----------------------------<  102[H]  4.7   |  24  |  1.45[H]    Ca    9.3      15 Oct 2024 06:33  Ca    8.9      14 Oct 2024 10:30  Phos  3.3     10-15  Mg     2.10     10-15  Mg     2.20     10-14    TPro  6.9  /  Alb  4.1  /  TBili  0.6  /  DBili  x   /  AST  21  /  ALT  20  /  AlkPhos  68  10-14    < from: Xray Chest 1 View- PORTABLE-Urgent (10.14.24 @ 13:49) >    ACC: [id number] EXAM:  XR CHEST PORTABLE URGENT 1V   ORDERED BY: CAMRYN MONGE     PROCEDURE DATE:  10/14/2024          INTERPRETATION:  CLINICAL HISTORY: 69 years old Male with Chest Pain.    TECHNIQUE: Single AP view of the chest    COMPARISON: Chest x-ray 8/7/2022.    FINDINGS:    Support devices: None.    Lungs/Pleura: Elevated right hemidiaphragm with low lung volume.   Increased interstitial markings and trace left-sided pleural effusion. No   lobar consolidations. No pneumothorax.    Heart/Mediastinum: Heart size not accurately assessed on this single   projection, however appears enlarged.    Skeletal/soft tissues: No acute pathology.    IMPRESSION:  Mild pulmonary vascular congestion. Trace left-sided pleural effusion.  No lobar consolidations.    --- End of Report ---          KRIS CHOPRA DO; Resident Radiologist  This document has been electronically signed.  TOÑO PEARSON DO; Attending Radiologist  This document has been electronically signed. Oct 14 2024  3:23PM    < end of copied text >  < from: TTE W or WO Ultrasound Enhancing Agent (10.15.24 @ 07:42) >  TRANSTHORACIC ECHOCARDIOGRAM REPORT  ________________________________________________________________________________                                      _______       Pt. Name:       EDUARDO ENG Study Date:    10/15/2024  MRN:[id number]            YOB: 1955  Accession #:    [id number]            Age:           69 years  Account#:       [id number]             Gender:        M  Heart Rate:                          Height:        62.00 in (157.48 cm)  Rhythm:                       Weight:        198.00 lb (89.81 kg)  Blood Pressure: 130/67 mmHg          BSA/BMI:       1.90 m² / 36.21 kg/m²  ________________________________________________________________________________________  Referring Physician:    [id number] Perry Davalos  Interpreting Physician: Perry Davalos MD  Primary Sonographer:    Yelitza Lema [facility]    CPT:                ECHO TTE WITH CON COMP W DOPP - .m;DEFINITY ECHO                      CONTRAST PER ML - .m  Indication(s):      Chest pain, unspecified - R07.9  Procedure:          Transthoracic echocardiogram with 2-D, M-mode and complete                      spectral and color flow Doppler.  Ordering Location:  [facility]  Admission Status:   ED  Contrast Injection: Verbal consent was obtained for injection of Ultrasonic                      Enhancing Agent following a discussion of risks and                      benefits.                      Endocardial visualization enhanced with 2 ml of Definity                      Ultrasound enhancing agent (Lot#:[id number] Exp.Date:06/25                      Discarded Dose:8ml).  UEA Reaction:       Patient had no adverse reaction after injection of                      Ultrasound Enhancing Agent.  Study Information:  Image quality for this study is fair.    _______________________________________________________________________________________     CONCLUSIONS:      1. Left ventricular wall thickness is mildly increased. Left ventricular systolic function is normal with an ejection fraction of 59 % by Roth's method of disks.   2. There is mild (grade 1) left ventricular diastolic dysfunction.   3. Moderately enlarged right ventricular cavity size, with normal wall thickness, and reduced right ventricular systolic function.   4. Nosignificant valvular disease.   5. No pericardial effusion seen.    < end of copied text >

## 2024-10-15 NOTE — DISCHARGE NOTE PROVIDER - NSDCMRMEDTOKEN_GEN_ALL_CORE_FT
aspirin 81 mg oral tablet, chewable: 1 tab(s) orally once a day  atorvastatin 80 mg oral tablet: 1 tab(s) orally once a day (at bedtime)  lisinopril 20 mg oral tablet: 1 tab(s) orally once a day

## 2024-10-15 NOTE — PRE PROCEDURE NOTE - PRE PROCEDURE EVALUATION
Pre Procedural Sedation Evaluation    Proceduralist: Dr. Severino Mixon/Dr. Mcneal. Ali     History and physical reviewed  Medications reviewed  Labs reviewed  EKG reviewed    Anticoagulation: None   Urine pregnancy: N/A  Dentures: Upper dentures in place   Last PO intake: 10/15/24 0830    Pre-Procedure Physical Assessment  Mental status: Alert and oriented x 3  Level of consciousness: 1  Cardiac assessment: Stable  Pulmonary assessment: Stable  Obstructive sleep apnea: No  Aspiration risk: No  Mallampati score: 2  ASA Classification: 3  Prior Sedative or Anesthesia Experience: No complications  Informed consent by responsible adult: Yes  Based on today's assessment, anesthesia consult requested: No, patient ate this morning

## 2024-10-15 NOTE — DISCHARGE NOTE PROVIDER - NSDCCPCAREPLAN_GEN_ALL_CORE_FT
PRINCIPAL DISCHARGE DIAGNOSIS  Diagnosis: Angina pectoris  Assessment and Plan of Treatment: You were admitted the hospital with chest pain.  Your blood work and EKG showed no signs of heart attack.  An ultrasound of your heart (echocardiogram) showed no acute findings.  An angiogram (cardiac catherization) of your heart showed narrowing blood vessels (luminal irregularities) with no sign of coronary artery disease.  Please follow up with your primary care doctor within 1 week for further management and monitoring.      SECONDARY DISCHARGE DIAGNOSES  Diagnosis: Essential hypertension  Assessment and Plan of Treatment: You have a history of high blood pressure.  Please continue medications as currently prescribed.  Please continue low salt, low cholesterol (DASH) diet. Follow up with your primary care doctor for further management.    Diagnosis: Hyperlipidemia  Assessment and Plan of Treatment: You have a history of high cholesterol.  Please continue your medications as currently prescribed.  Please continue low salt, low cholesterol (DASH) diet. Follow up with your primary care doctor for further management.

## 2025-07-29 NOTE — ED ADULT TRIAGE NOTE - BMI (KG/M2)
Left message on voice mail to call.   Patient has hospital appointment on 8/1/2025 with Sabrina Cosme   36.3